# Patient Record
Sex: FEMALE | Race: WHITE | Employment: OTHER | URBAN - METROPOLITAN AREA
[De-identification: names, ages, dates, MRNs, and addresses within clinical notes are randomized per-mention and may not be internally consistent; named-entity substitution may affect disease eponyms.]

---

## 2017-06-08 ENCOUNTER — HOSPITAL ENCOUNTER (OUTPATIENT)
Dept: MAMMOGRAPHY | Age: 57
Discharge: HOME | End: 2017-06-08
Attending: OBSTETRICS & GYNECOLOGY
Payer: COMMERCIAL

## 2017-06-08 DIAGNOSIS — Z12.31 ENCOUNTER FOR SCREENING MAMMOGRAM FOR MALIGNANT NEOPLASM OF BREAST: ICD-10-CM

## 2017-06-08 PROCEDURE — 77067 SCR MAMMO BI INCL CAD: CPT | Performed by: OBSTETRICS & GYNECOLOGY

## 2017-08-04 ENCOUNTER — HOSPITAL ENCOUNTER (OUTPATIENT)
Dept: ULTRASOUND IMAGING | Age: 57
Discharge: HOME | End: 2017-08-04
Attending: OBSTETRICS & GYNECOLOGY
Payer: COMMERCIAL

## 2017-08-04 DIAGNOSIS — R19.00 PELVIC MASS: ICD-10-CM

## 2017-08-04 PROCEDURE — 76830 TRANSVAGINAL US NON-OB: CPT | Performed by: OBSTETRICS & GYNECOLOGY

## 2017-08-04 PROCEDURE — 76856 US EXAM PELVIC COMPLETE: CPT | Performed by: OBSTETRICS & GYNECOLOGY

## 2017-09-12 NOTE — H&P
[facility]    PATIENT'S NAME: Jonathon Parra   ATTENDING PHYSICIAN: Apple Osorio MD   PATIENT ACCOUNT#:   [de-identified]    LOCATION:  [facility]  MEDICAL RECORD #:   [id number]       YOB: 1960  ADMISSION DATE:       09/22/20 uterus is bulky approximately 10 to 12 week size. No adnexal masses are appreciated. The patient underwent preoperative simple cystometry which confirmed mixed urinary incontinence and complete emptying.     IMPRESSION:  Symptomatic stage II pelvic orga

## 2017-09-18 ENCOUNTER — LAB ENCOUNTER (OUTPATIENT)
Dept: LAB | Age: 57
End: 2017-09-18
Attending: OBSTETRICS & GYNECOLOGY
Payer: COMMERCIAL

## 2017-09-18 DIAGNOSIS — Z01.818 PREOP TESTING: ICD-10-CM

## 2017-09-18 LAB
ANTIBODY SCREEN: NEGATIVE
BASOPHILS # BLD: 0.1 K/UL (ref 0–0.2)
BASOPHILS NFR BLD: 1 %
EOSINOPHIL # BLD: 0.1 K/UL (ref 0–0.7)
EOSINOPHIL NFR BLD: 1 %
ERYTHROCYTE [DISTWIDTH] IN BLOOD BY AUTOMATED COUNT: 14.1 % (ref 11–15)
HCT VFR BLD AUTO: 44.1 % (ref 35–48)
HGB BLD-MCNC: 14.6 G/DL (ref 12–16)
LYMPHOCYTES # BLD: 1.3 K/UL (ref 1–4)
LYMPHOCYTES NFR BLD: 20 %
MCH RBC QN AUTO: 30.9 PG (ref 27–32)
MCHC RBC AUTO-ENTMCNC: 33.1 G/DL (ref 32–37)
MCV RBC AUTO: 93.4 FL (ref 80–100)
MONOCYTES # BLD: 0.5 K/UL (ref 0–1)
MONOCYTES NFR BLD: 9 %
NEUTROPHILS # BLD AUTO: 4.4 K/UL (ref 1.8–7.7)
NEUTROPHILS NFR BLD: 69 %
PLATELET # BLD AUTO: 205 K/UL (ref 140–400)
PMV BLD AUTO: 9.5 FL (ref 7.4–10.3)
RBC # BLD AUTO: 4.72 M/UL (ref 3.7–5.4)
RH BLOOD TYPE: POSITIVE
WBC # BLD AUTO: 6.4 K/UL (ref 4–11)

## 2017-09-18 PROCEDURE — 86900 BLOOD TYPING SEROLOGIC ABO: CPT

## 2017-09-18 PROCEDURE — 36415 COLL VENOUS BLD VENIPUNCTURE: CPT

## 2017-09-18 PROCEDURE — 85025 COMPLETE CBC W/AUTO DIFF WBC: CPT

## 2017-09-18 PROCEDURE — 86901 BLOOD TYPING SEROLOGIC RH(D): CPT

## 2017-09-18 PROCEDURE — 86850 RBC ANTIBODY SCREEN: CPT

## 2017-09-21 NOTE — H&P
[facility]    PATIENT'S NAME: Amy Wongmima   ATTENDING PHYSICIAN: Mary Jane Alberts MD   PATIENT ACCOUNT#:   [de-identified]    LOCATION:  [facility]  MEDICAL RECORD #:   [id number]       YOB: 1960  ADMISSION DATE:       09/22/20 appearing. LUNGS:  Clear to auscultation bilaterally. HEART:  Regular rate and rhythm. ABDOMEN:  Soft, nontender, nondistended. PELVIC:  Uterus consistent with ultrasound findings showing posterior fibroid; however, she has good uterine descensus.   E

## 2017-09-22 ENCOUNTER — HOSPITAL ENCOUNTER (INPATIENT)
Facility: HOSPITAL | Age: 57
LOS: 1 days | Discharge: HOME | DRG: 743 | End: 2017-09-23
Attending: OBSTETRICS & GYNECOLOGY | Admitting: OBSTETRICS & GYNECOLOGY
Payer: COMMERCIAL

## 2017-09-22 ENCOUNTER — SURGERY (OUTPATIENT)
Age: 57
End: 2017-09-22

## 2017-09-22 ENCOUNTER — ANESTHESIA EVENT (OUTPATIENT)
Dept: SURGERY | Facility: HOSPITAL | Age: 57
DRG: 743 | End: 2017-09-22
Payer: COMMERCIAL

## 2017-09-22 ENCOUNTER — ANESTHESIA (OUTPATIENT)
Dept: SURGERY | Facility: HOSPITAL | Age: 57
DRG: 743 | End: 2017-09-22
Payer: COMMERCIAL

## 2017-09-22 DIAGNOSIS — Z01.818 PREOP TESTING: Primary | ICD-10-CM

## 2017-09-22 PROCEDURE — 0JQC0ZZ REPAIR PELVIC REGION SUBCUTANEOUS TISSUE AND FASCIA, OPEN APPROACH: ICD-10-PCS | Performed by: OBSTETRICS & GYNECOLOGY

## 2017-09-22 PROCEDURE — 0TSD0ZZ REPOSITION URETHRA, OPEN APPROACH: ICD-10-PCS | Performed by: OBSTETRICS & GYNECOLOGY

## 2017-09-22 PROCEDURE — 0USG0ZZ REPOSITION VAGINA, OPEN APPROACH: ICD-10-PCS | Performed by: OBSTETRICS & GYNECOLOGY

## 2017-09-22 PROCEDURE — 0UT7FZZ RESECTION OF BILATERAL FALLOPIAN TUBES, VIA NATURAL OR ARTIFICIAL OPENING WITH PERCUTANEOUS ENDOSCOPIC ASSISTANCE: ICD-10-PCS | Performed by: OBSTETRICS & GYNECOLOGY

## 2017-09-22 PROCEDURE — 0UTC7ZZ RESECTION OF CERVIX, VIA NATURAL OR ARTIFICIAL OPENING: ICD-10-PCS | Performed by: OBSTETRICS & GYNECOLOGY

## 2017-09-22 PROCEDURE — 0UT9FZZ RESECTION OF UTERUS, VIA NATURAL OR ARTIFICIAL OPENING WITH PERCUTANEOUS ENDOSCOPIC ASSISTANCE: ICD-10-PCS | Performed by: OBSTETRICS & GYNECOLOGY

## 2017-09-22 PROCEDURE — 0TJB8ZZ INSPECTION OF BLADDER, VIA NATURAL OR ARTIFICIAL OPENING ENDOSCOPIC: ICD-10-PCS | Performed by: OBSTETRICS & GYNECOLOGY

## 2017-09-22 PROCEDURE — 88307 TISSUE EXAM BY PATHOLOGIST: CPT | Performed by: OBSTETRICS & GYNECOLOGY

## 2017-09-22 DEVICE — CONTINENCE SYSTEM
Type: IMPLANTABLE DEVICE | Status: FUNCTIONAL
Brand: GYNECARE TVT EXACT

## 2017-09-22 RX ORDER — HYDROCODONE BITARTRATE AND ACETAMINOPHEN 7.5; 325 MG/1; MG/1
1 TABLET ORAL EVERY 4 HOURS PRN
Status: DISCONTINUED | OUTPATIENT
Start: 2017-09-22 | End: 2017-09-23

## 2017-09-22 RX ORDER — HYDROMORPHONE HYDROCHLORIDE 1 MG/ML
0.4 INJECTION, SOLUTION INTRAMUSCULAR; INTRAVENOUS; SUBCUTANEOUS EVERY 5 MIN PRN
Status: DISCONTINUED | OUTPATIENT
Start: 2017-09-22 | End: 2017-09-22

## 2017-09-22 RX ORDER — ONDANSETRON 2 MG/ML
INJECTION INTRAMUSCULAR; INTRAVENOUS AS NEEDED
Status: DISCONTINUED | OUTPATIENT
Start: 2017-09-22 | End: 2017-09-22 | Stop reason: SURG

## 2017-09-22 RX ORDER — NEOSTIGMINE METHYLSULFATE 0.5 MG/ML
INJECTION INTRAVENOUS AS NEEDED
Status: DISCONTINUED | OUTPATIENT
Start: 2017-09-22 | End: 2017-09-22 | Stop reason: SURG

## 2017-09-22 RX ORDER — ACETAMINOPHEN 325 MG/1
650 TABLET ORAL ONCE
Status: COMPLETED | OUTPATIENT
Start: 2017-09-22 | End: 2017-09-22

## 2017-09-22 RX ORDER — DIPHENHYDRAMINE HYDROCHLORIDE 50 MG/ML
12.5 INJECTION INTRAMUSCULAR; INTRAVENOUS EVERY 4 HOURS PRN
Status: DISCONTINUED | OUTPATIENT
Start: 2017-09-22 | End: 2017-09-23

## 2017-09-22 RX ORDER — ACETAMINOPHEN 325 MG/1
650 TABLET ORAL EVERY 4 HOURS PRN
Status: DISCONTINUED | OUTPATIENT
Start: 2017-09-22 | End: 2017-09-23

## 2017-09-22 RX ORDER — HYDROCODONE BITARTRATE AND ACETAMINOPHEN 7.5; 325 MG/1; MG/1
2 TABLET ORAL EVERY 4 HOURS PRN
Status: DISCONTINUED | OUTPATIENT
Start: 2017-09-22 | End: 2017-09-23

## 2017-09-22 RX ORDER — MIDAZOLAM HYDROCHLORIDE 1 MG/ML
INJECTION INTRAMUSCULAR; INTRAVENOUS AS NEEDED
Status: DISCONTINUED | OUTPATIENT
Start: 2017-09-22 | End: 2017-09-22 | Stop reason: SURG

## 2017-09-22 RX ORDER — NALOXONE HYDROCHLORIDE 0.4 MG/ML
80 INJECTION, SOLUTION INTRAMUSCULAR; INTRAVENOUS; SUBCUTANEOUS AS NEEDED
Status: DISCONTINUED | OUTPATIENT
Start: 2017-09-22 | End: 2017-09-22

## 2017-09-22 RX ORDER — ONDANSETRON 2 MG/ML
4 INJECTION INTRAMUSCULAR; INTRAVENOUS EVERY 8 HOURS PRN
Status: DISCONTINUED | OUTPATIENT
Start: 2017-09-22 | End: 2017-09-23

## 2017-09-22 RX ORDER — HYDROMORPHONE HYDROCHLORIDE 1 MG/ML
0.2 INJECTION, SOLUTION INTRAMUSCULAR; INTRAVENOUS; SUBCUTANEOUS EVERY 5 MIN PRN
Status: DISCONTINUED | OUTPATIENT
Start: 2017-09-22 | End: 2017-09-22

## 2017-09-22 RX ORDER — MORPHINE SULFATE 2 MG/ML
2 INJECTION, SOLUTION INTRAMUSCULAR; INTRAVENOUS EVERY 10 MIN PRN
Status: DISCONTINUED | OUTPATIENT
Start: 2017-09-22 | End: 2017-09-22

## 2017-09-22 RX ORDER — SODIUM CHLORIDE, SODIUM LACTATE, POTASSIUM CHLORIDE, CALCIUM CHLORIDE 600; 310; 30; 20 MG/100ML; MG/100ML; MG/100ML; MG/100ML
INJECTION, SOLUTION INTRAVENOUS CONTINUOUS
Status: DISCONTINUED | OUTPATIENT
Start: 2017-09-22 | End: 2017-09-22 | Stop reason: HOSPADM

## 2017-09-22 RX ORDER — ONDANSETRON 4 MG/1
4 TABLET, FILM COATED ORAL EVERY 8 HOURS PRN
Status: DISCONTINUED | OUTPATIENT
Start: 2017-09-22 | End: 2017-09-23

## 2017-09-22 RX ORDER — FAMOTIDINE 20 MG/1
20 TABLET ORAL ONCE
Status: COMPLETED | OUTPATIENT
Start: 2017-09-22 | End: 2017-09-22

## 2017-09-22 RX ORDER — KETOROLAC TROMETHAMINE 30 MG/ML
30 INJECTION, SOLUTION INTRAMUSCULAR; INTRAVENOUS EVERY 6 HOURS
Status: DISCONTINUED | OUTPATIENT
Start: 2017-09-22 | End: 2017-09-23

## 2017-09-22 RX ORDER — METOCLOPRAMIDE 10 MG/1
10 TABLET ORAL ONCE
Status: COMPLETED | OUTPATIENT
Start: 2017-09-22 | End: 2017-09-22

## 2017-09-22 RX ORDER — ONDANSETRON 2 MG/ML
4 INJECTION INTRAMUSCULAR; INTRAVENOUS ONCE AS NEEDED
Status: DISCONTINUED | OUTPATIENT
Start: 2017-09-22 | End: 2017-09-22

## 2017-09-22 RX ORDER — DOCUSATE SODIUM 100 MG/1
100 CAPSULE, LIQUID FILLED ORAL 2 TIMES DAILY
Status: DISCONTINUED | OUTPATIENT
Start: 2017-09-22 | End: 2017-09-23

## 2017-09-22 RX ORDER — SODIUM PHOSPHATE, DIBASIC AND SODIUM PHOSPHATE, MONOBASIC 7; 19 G/133ML; G/133ML
1 ENEMA RECTAL ONCE AS NEEDED
Status: DISCONTINUED | OUTPATIENT
Start: 2017-09-22 | End: 2017-09-23

## 2017-09-22 RX ORDER — ZOLPIDEM TARTRATE 5 MG/1
5 TABLET ORAL NIGHTLY PRN
Status: DISCONTINUED | OUTPATIENT
Start: 2017-09-22 | End: 2017-09-23

## 2017-09-22 RX ORDER — HYDROCODONE BITARTRATE AND ACETAMINOPHEN 5; 325 MG/1; MG/1
1 TABLET ORAL AS NEEDED
Status: DISCONTINUED | OUTPATIENT
Start: 2017-09-22 | End: 2017-09-22

## 2017-09-22 RX ORDER — MORPHINE SULFATE 10 MG/ML
6 INJECTION, SOLUTION INTRAMUSCULAR; INTRAVENOUS EVERY 10 MIN PRN
Status: DISCONTINUED | OUTPATIENT
Start: 2017-09-22 | End: 2017-09-22

## 2017-09-22 RX ORDER — SODIUM CHLORIDE 0.9 % (FLUSH) 0.9 %
10 SYRINGE (ML) INJECTION AS NEEDED
Status: DISCONTINUED | OUTPATIENT
Start: 2017-09-22 | End: 2017-09-23

## 2017-09-22 RX ORDER — DEXAMETHASONE SODIUM PHOSPHATE 4 MG/ML
VIAL (ML) INJECTION AS NEEDED
Status: DISCONTINUED | OUTPATIENT
Start: 2017-09-22 | End: 2017-09-22 | Stop reason: SURG

## 2017-09-22 RX ORDER — HYDROMORPHONE HYDROCHLORIDE 1 MG/ML
0.4 INJECTION, SOLUTION INTRAMUSCULAR; INTRAVENOUS; SUBCUTANEOUS EVERY 2 HOUR PRN
Status: DISCONTINUED | OUTPATIENT
Start: 2017-09-22 | End: 2017-09-23

## 2017-09-22 RX ORDER — HYDROMORPHONE HYDROCHLORIDE 1 MG/ML
0.2 INJECTION, SOLUTION INTRAMUSCULAR; INTRAVENOUS; SUBCUTANEOUS EVERY 2 HOUR PRN
Status: DISCONTINUED | OUTPATIENT
Start: 2017-09-22 | End: 2017-09-23

## 2017-09-22 RX ORDER — LIDOCAINE HYDROCHLORIDE 10 MG/ML
INJECTION, SOLUTION EPIDURAL; INFILTRATION; INTRACAUDAL; PERINEURAL AS NEEDED
Status: DISCONTINUED | OUTPATIENT
Start: 2017-09-22 | End: 2017-09-22 | Stop reason: SURG

## 2017-09-22 RX ORDER — HYDROCODONE BITARTRATE AND ACETAMINOPHEN 5; 325 MG/1; MG/1
2 TABLET ORAL AS NEEDED
Status: DISCONTINUED | OUTPATIENT
Start: 2017-09-22 | End: 2017-09-22

## 2017-09-22 RX ORDER — HYDROMORPHONE HYDROCHLORIDE 1 MG/ML
0.8 INJECTION, SOLUTION INTRAMUSCULAR; INTRAVENOUS; SUBCUTANEOUS EVERY 2 HOUR PRN
Status: DISCONTINUED | OUTPATIENT
Start: 2017-09-22 | End: 2017-09-23

## 2017-09-22 RX ORDER — ROCURONIUM BROMIDE 10 MG/ML
INJECTION, SOLUTION INTRAVENOUS AS NEEDED
Status: DISCONTINUED | OUTPATIENT
Start: 2017-09-22 | End: 2017-09-22 | Stop reason: SURG

## 2017-09-22 RX ORDER — BISACODYL 10 MG
10 SUPPOSITORY, RECTAL RECTAL
Status: DISCONTINUED | OUTPATIENT
Start: 2017-09-22 | End: 2017-09-23

## 2017-09-22 RX ORDER — DEXTROSE, SODIUM CHLORIDE, SODIUM LACTATE, POTASSIUM CHLORIDE, AND CALCIUM CHLORIDE 5; .6; .31; .03; .02 G/100ML; G/100ML; G/100ML; G/100ML; G/100ML
INJECTION, SOLUTION INTRAVENOUS CONTINUOUS
Status: DISCONTINUED | OUTPATIENT
Start: 2017-09-22 | End: 2017-09-23

## 2017-09-22 RX ORDER — SODIUM CHLORIDE, SODIUM LACTATE, POTASSIUM CHLORIDE, CALCIUM CHLORIDE 600; 310; 30; 20 MG/100ML; MG/100ML; MG/100ML; MG/100ML
INJECTION, SOLUTION INTRAVENOUS CONTINUOUS
Status: DISCONTINUED | OUTPATIENT
Start: 2017-09-22 | End: 2017-09-22

## 2017-09-22 RX ORDER — BUPIVACAINE HYDROCHLORIDE AND EPINEPHRINE 2.5; 5 MG/ML; UG/ML
INJECTION, SOLUTION INFILTRATION; PERINEURAL AS NEEDED
Status: DISCONTINUED | OUTPATIENT
Start: 2017-09-22 | End: 2017-09-22

## 2017-09-22 RX ORDER — HYDROMORPHONE HYDROCHLORIDE 1 MG/ML
0.6 INJECTION, SOLUTION INTRAMUSCULAR; INTRAVENOUS; SUBCUTANEOUS EVERY 5 MIN PRN
Status: DISCONTINUED | OUTPATIENT
Start: 2017-09-22 | End: 2017-09-22

## 2017-09-22 RX ORDER — GLYCOPYRROLATE 0.2 MG/ML
INJECTION INTRAMUSCULAR; INTRAVENOUS AS NEEDED
Status: DISCONTINUED | OUTPATIENT
Start: 2017-09-22 | End: 2017-09-22 | Stop reason: SURG

## 2017-09-22 RX ORDER — POLYETHYLENE GLYCOL 3350 17 G/17G
17 POWDER, FOR SOLUTION ORAL DAILY PRN
Status: DISCONTINUED | OUTPATIENT
Start: 2017-09-22 | End: 2017-09-23

## 2017-09-22 RX ORDER — MORPHINE SULFATE 4 MG/ML
4 INJECTION, SOLUTION INTRAMUSCULAR; INTRAVENOUS EVERY 10 MIN PRN
Status: DISCONTINUED | OUTPATIENT
Start: 2017-09-22 | End: 2017-09-22

## 2017-09-22 RX ADMIN — LIDOCAINE HYDROCHLORIDE 50 MG: 10 INJECTION, SOLUTION EPIDURAL; INFILTRATION; INTRACAUDAL; PERINEURAL at 07:50:00

## 2017-09-22 RX ADMIN — ROCURONIUM BROMIDE 40 MG: 10 INJECTION, SOLUTION INTRAVENOUS at 07:50:00

## 2017-09-22 RX ADMIN — SODIUM CHLORIDE, SODIUM LACTATE, POTASSIUM CHLORIDE, CALCIUM CHLORIDE: 600; 310; 30; 20 INJECTION, SOLUTION INTRAVENOUS at 08:13:00

## 2017-09-22 RX ADMIN — GLYCOPYRROLATE 0.2 MG: 0.2 INJECTION INTRAMUSCULAR; INTRAVENOUS at 07:50:00

## 2017-09-22 RX ADMIN — SODIUM CHLORIDE, SODIUM LACTATE, POTASSIUM CHLORIDE, CALCIUM CHLORIDE: 600; 310; 30; 20 INJECTION, SOLUTION INTRAVENOUS at 10:59:00

## 2017-09-22 RX ADMIN — DEXAMETHASONE SODIUM PHOSPHATE 4 MG: 4 MG/ML VIAL (ML) INJECTION at 07:50:00

## 2017-09-22 RX ADMIN — GLYCOPYRROLATE 0.6 MG: 0.2 INJECTION INTRAMUSCULAR; INTRAVENOUS at 10:30:00

## 2017-09-22 RX ADMIN — ONDANSETRON 4 MG: 2 INJECTION INTRAMUSCULAR; INTRAVENOUS at 07:50:00

## 2017-09-22 RX ADMIN — MIDAZOLAM HYDROCHLORIDE 2 MG: 1 INJECTION INTRAMUSCULAR; INTRAVENOUS at 07:46:00

## 2017-09-22 RX ADMIN — SODIUM CHLORIDE, SODIUM LACTATE, POTASSIUM CHLORIDE, CALCIUM CHLORIDE: 600; 310; 30; 20 INJECTION, SOLUTION INTRAVENOUS at 07:46:00

## 2017-09-22 RX ADMIN — NEOSTIGMINE METHYLSULFATE 4 MG: 0.5 INJECTION INTRAVENOUS at 10:30:00

## 2017-09-22 NOTE — INTERVAL H&P NOTE
Pre-op Diagnosis: Uterovaginal prolapse, mized incontinence     The above referenced H&P was reviewed by Juan A Maynard MD on 9/22/2017, the patient was examined and no significant changes have occurred in the patient's condition since the H&P was perf

## 2017-09-22 NOTE — ANESTHESIA PREPROCEDURE EVALUATION
Anesthesia PreOp Note    HPI:     Sheridan Hernandez is a 61year old female who presents for preoperative consultation requested by: Sonido Garces MD    Date of Surgery: 9/22/2017    Procedure(s):  ANTERIOR POSTERIOR REPAIR  PUBO VAGINAL SLING  CYSTOS History   Marital status:   Spouse name: N/A    Years of education: N/A  Number of children: N/A     Occupational History  None on file     Social History Main Topics   Smoking status: Never Smoker    Smokeless tobacco: Never Used    Alcohol use Yes alternative forms of anesthetic management. All of the patient's questions were answered to the best of my ability. The patient desires the anesthetic management as planned.   Cynthia Bass  9/22/2017 7:41 AM

## 2017-09-22 NOTE — ANESTHESIA POSTPROCEDURE EVALUATION
Patient: Jordan Rose    Procedure Summary     Date:  09/22/17 Room / Location:  [address] [place] [address] MAIN OR 16 / [address] [place] [address] MAIN OR    Anesthesia Start:  8499 Anesthesia Stop:      Procedures:       ANTERIOR POSTERIOR REPAIR (N/A )      PUBO VAGINAL SLING (N/A )      CYSTOS

## 2017-09-22 NOTE — BRIEF OP NOTE
Pre-Operative Diagnosis: Uterovaginal prolapse, mized incontinence      Post-Operative Diagnosis: Uterovaginal prolapse, mized incontinence      Procedure Performed:   Procedure(s):  laparoscopic assisted vaginal hysterectomy, possible bilateral salping

## 2017-09-22 NOTE — BRIEF OP NOTE
Pre-Operative Diagnosis: Uterovaginal prolapse, mixed incontinence      Post-Operative Diagnosis: Uterovaginal prolapse, mixed incontinence      Procedure Performed:   Procedure(s):  laparoscopic assisted vaginal hysterectomy, bilateral salpingectomy, u

## 2017-09-22 NOTE — OPERATIVE REPORT
[facility]    PATIENT'S NAME: Shana Schumacher   ATTENDING PHYSICIAN: Zayra Cornell MD   OPERATING PHYSICIAN: Amy Gant MD   PATIENT ACCOUNT#:   [id number]    LOCATION:  [address] [place] [address] #:   [id number]       [facility] [place] cervix was grasped with 2 tenacula and a circumferential incision was made with the Bovie around the cervix. This was dissected back until the posterior peritoneum could be entered using a scissors.   A retractor was placed then and serial dissection was p

## 2017-09-23 VITALS
HEART RATE: 81 BPM | OXYGEN SATURATION: 99 % | RESPIRATION RATE: 18 BRPM | SYSTOLIC BLOOD PRESSURE: 118 MMHG | DIASTOLIC BLOOD PRESSURE: 61 MMHG | WEIGHT: 194 LBS | BODY MASS INDEX: 33.12 KG/M2 | TEMPERATURE: 98 F | HEIGHT: 64 IN

## 2017-09-23 PROBLEM — N81.4 UTEROVAGINAL PROLAPSE: Status: ACTIVE | Noted: 2017-09-23

## 2017-09-23 PROBLEM — D21.9 LEIOMYOMA: Status: RESOLVED | Noted: 2017-09-23 | Resolved: 2017-09-23

## 2017-09-23 PROBLEM — N39.46 MIXED URGE AND STRESS INCONTINENCE: Status: RESOLVED | Noted: 2017-09-23 | Resolved: 2017-09-23

## 2017-09-23 PROBLEM — N39.46 MIXED URGE AND STRESS INCONTINENCE: Status: ACTIVE | Noted: 2017-09-23

## 2017-09-23 PROBLEM — N81.4 UTEROVAGINAL PROLAPSE: Status: RESOLVED | Noted: 2017-09-23 | Resolved: 2017-09-23

## 2017-09-23 PROBLEM — Z01.818 PREOP TESTING: Status: RESOLVED | Noted: 2017-09-22 | Resolved: 2017-09-23

## 2017-09-23 PROBLEM — D21.9 LEIOMYOMA: Status: ACTIVE | Noted: 2017-09-23

## 2017-09-23 LAB
HCT VFR BLD AUTO: 37.3 % (ref 35–48)
HGB BLD-MCNC: 12.3 G/DL (ref 12–16)

## 2017-09-23 PROCEDURE — 85018 HEMOGLOBIN: CPT | Performed by: OBSTETRICS & GYNECOLOGY

## 2017-09-23 PROCEDURE — 85014 HEMATOCRIT: CPT | Performed by: OBSTETRICS & GYNECOLOGY

## 2017-09-23 RX ORDER — HYDROCODONE BITARTRATE AND ACETAMINOPHEN 7.5; 325 MG/1; MG/1
1 TABLET ORAL EVERY 4 HOURS PRN
Qty: 5 TABLET | Refills: 0 | Status: SHIPPED | OUTPATIENT
Start: 2017-09-23 | End: 2019-06-18

## 2017-09-23 NOTE — DISCHARGE SUMMARY
[facility] AND [facility]  Discharge Summary    Azra Byrd Patient Status:  Inpatient    1960 MRN [id number]   Location [facility] 4W/SW/SE Attending Zeferino Ho MD   Hosp Day # 1 PCP Claudean Palmer, MD     Date of Admission:  9

## 2017-09-23 NOTE — OPERATIVE REPORT
[facility]    PATIENT'S NAME: Lilli Ortiz   ATTENDING PHYSICIAN: Gonzalo Ferguson MD   OPERATING PHYSICIAN: Gonzalo Ferguson MD   PATIENT ACCOUNT#:   [de-identified]    LOCATION:  [address] [place] [address] #:   [id number]       DATE OF the bladder. A rectal exam at the end of the procedure also confirmed no suture penetration through the rectal mucosa.     PROCEDURE:  The patient was immediately status post laparoscopic-assisted vaginal hysterectomy with Dr. Rakan Ron and I, please see he trocars were passed out through the suprapubic incisions. Tension on the tape was adjusted.   The trocars were cut free from the tape and the plastic sheath overlying the tape was removed through the suprapubic incisions while the 's finger was kep

## 2017-09-23 NOTE — PLAN OF CARE
Patient rested well through the night with minimal c/o pain controlled well with scheduled pain medication. Patient having scant amounts of spotting to daisha pad. Carrington removed per orders, awaiting patient first void post carrington.  Patient tolerating general d

## 2017-09-23 NOTE — PROGRESS NOTES
Kendrick Quinonez  [id number]  September 23, 2017  12:23 PM      GYN Progress Note    Subjective: rita reg diet, pain controlled with Toradol, no Norco  Objective:  /61 (BP Location: Right arm)   Pulse 81   Temp 98.1 °F (36.7 °C) (Oral)   Resp 18   Ht 5

## 2018-07-06 ENCOUNTER — HOSPITAL ENCOUNTER (OUTPATIENT)
Dept: MAMMOGRAPHY | Age: 58
Discharge: HOME | End: 2018-07-06
Attending: OBSTETRICS & GYNECOLOGY
Payer: COMMERCIAL

## 2018-07-06 DIAGNOSIS — Z12.31 ENCOUNTER FOR SCREENING MAMMOGRAM FOR MALIGNANT NEOPLASM OF BREAST: ICD-10-CM

## 2018-07-06 PROCEDURE — 77067 SCR MAMMO BI INCL CAD: CPT | Performed by: OBSTETRICS & GYNECOLOGY

## 2018-09-30 NOTE — LETTER
[facility]   Administracion de Anestesia  1.  Nelda Fried, ERIC ___________________________________ Toño maria                                         (Representante)   parte, solicito se me administre anestesia yasmin el procedimient PT C/O Left sided CP starting 3:30 pm. Denies SOB, dyspnea. PMH: HTN, HLD. Given  162 ASA and 1 intranasal nitroglycerin spray by EMS en route. 20 G IV to L hand placed by EMS. presion, dificultad al orinar, dolor de killian, disminucion del ritmo cardiaco del emeterio. Riesgos remotos incluyen: infecciones, alto bloqueo dillard, sangramiento del canal dillard, convulciones, parocardiaco y Philipp.   7. CONCIENCIA: Comprendo que es posib ([address])                                                                           (Firma del Testigo)                                                                     _________________________    ____________________________________________

## 2019-07-11 ENCOUNTER — HOSPITAL ENCOUNTER (OUTPATIENT)
Dept: MAMMOGRAPHY | Age: 59
Discharge: HOME | End: 2019-07-11
Attending: OBSTETRICS & GYNECOLOGY
Payer: COMMERCIAL

## 2019-07-11 ENCOUNTER — HOSPITAL ENCOUNTER (OUTPATIENT)
Dept: BONE DENSITY | Age: 59
Discharge: HOME | End: 2019-07-11
Attending: OBSTETRICS & GYNECOLOGY
Payer: COMMERCIAL

## 2019-07-11 DIAGNOSIS — Z12.31 ENCOUNTER FOR SCREENING MAMMOGRAM FOR MALIGNANT NEOPLASM OF BREAST: ICD-10-CM

## 2019-07-11 DIAGNOSIS — N95.1 SYMPTOMATIC MENOPAUSAL OR FEMALE CLIMACTERIC STATES: ICD-10-CM

## 2019-07-11 PROCEDURE — 77080 DXA BONE DENSITY AXIAL: CPT | Performed by: OBSTETRICS & GYNECOLOGY

## 2019-07-11 PROCEDURE — 77067 SCR MAMMO BI INCL CAD: CPT | Performed by: OBSTETRICS & GYNECOLOGY

## 2019-07-11 PROCEDURE — 77063 BREAST TOMOSYNTHESIS BI: CPT | Performed by: OBSTETRICS & GYNECOLOGY

## 2019-07-29 ENCOUNTER — HOSPITAL ENCOUNTER (OUTPATIENT)
Dept: ULTRASOUND IMAGING | Facility: HOSPITAL | Age: 59
Discharge: HOME | End: 2019-07-29
Attending: OBSTETRICS & GYNECOLOGY
Payer: COMMERCIAL

## 2019-07-29 ENCOUNTER — HOSPITAL ENCOUNTER (OUTPATIENT)
Dept: MAMMOGRAPHY | Facility: HOSPITAL | Age: 59
Discharge: HOME | End: 2019-07-29
Attending: OBSTETRICS & GYNECOLOGY
Payer: COMMERCIAL

## 2019-07-29 DIAGNOSIS — R92.8 ABNORMAL MAMMOGRAM: ICD-10-CM

## 2019-07-29 PROCEDURE — 76642 ULTRASOUND BREAST LIMITED: CPT | Performed by: OBSTETRICS & GYNECOLOGY

## 2019-07-29 PROCEDURE — 77065 DX MAMMO INCL CAD UNI: CPT | Performed by: OBSTETRICS & GYNECOLOGY

## 2019-07-29 PROCEDURE — 77061 BREAST TOMOSYNTHESIS UNI: CPT | Performed by: OBSTETRICS & GYNECOLOGY

## 2020-01-27 ENCOUNTER — HOSPITAL ENCOUNTER (OUTPATIENT)
Dept: MAMMOGRAPHY | Facility: HOSPITAL | Age: 60
Discharge: HOME | End: 2020-01-27
Attending: OBSTETRICS & GYNECOLOGY
Payer: COMMERCIAL

## 2020-01-27 ENCOUNTER — HOSPITAL ENCOUNTER (OUTPATIENT)
Dept: ULTRASOUND IMAGING | Facility: HOSPITAL | Age: 60
Discharge: HOME | End: 2020-01-27
Attending: OBSTETRICS & GYNECOLOGY
Payer: COMMERCIAL

## 2020-01-27 DIAGNOSIS — R92.8 OTHER ABNORMAL AND INCONCLUSIVE FINDINGS ON DIAGNOSTIC IMAGING OF BREAST: ICD-10-CM

## 2020-01-27 PROCEDURE — 77065 DX MAMMO INCL CAD UNI: CPT | Performed by: OBSTETRICS & GYNECOLOGY

## 2020-01-27 PROCEDURE — 76642 ULTRASOUND BREAST LIMITED: CPT | Performed by: OBSTETRICS & GYNECOLOGY

## 2020-01-27 PROCEDURE — 77061 BREAST TOMOSYNTHESIS UNI: CPT | Performed by: OBSTETRICS & GYNECOLOGY

## 2020-08-20 ENCOUNTER — HOSPITAL ENCOUNTER (OUTPATIENT)
Dept: ULTRASOUND IMAGING | Facility: HOSPITAL | Age: 60
Discharge: HOME | End: 2020-08-20
Attending: OBSTETRICS & GYNECOLOGY
Payer: COMMERCIAL

## 2020-08-20 ENCOUNTER — HOSPITAL ENCOUNTER (OUTPATIENT)
Dept: MAMMOGRAPHY | Facility: HOSPITAL | Age: 60
Discharge: HOME | End: 2020-08-20
Attending: OBSTETRICS & GYNECOLOGY
Payer: COMMERCIAL

## 2020-08-20 DIAGNOSIS — R92.8 ABNORMAL MAMMOGRAM: ICD-10-CM

## 2020-08-20 PROCEDURE — 77066 DX MAMMO INCL CAD BI: CPT | Performed by: OBSTETRICS & GYNECOLOGY

## 2020-08-20 PROCEDURE — 77062 BREAST TOMOSYNTHESIS BI: CPT | Performed by: OBSTETRICS & GYNECOLOGY

## 2020-08-20 PROCEDURE — 76642 ULTRASOUND BREAST LIMITED: CPT | Performed by: OBSTETRICS & GYNECOLOGY

## 2021-02-22 ENCOUNTER — HOSPITAL ENCOUNTER (OUTPATIENT)
Dept: ULTRASOUND IMAGING | Facility: HOSPITAL | Age: 61
Discharge: HOME | End: 2021-02-22
Attending: OBSTETRICS & GYNECOLOGY
Payer: COMMERCIAL

## 2021-02-22 DIAGNOSIS — R92.8 ABNORMAL ULTRASOUND OF BREAST: ICD-10-CM

## 2021-02-22 DIAGNOSIS — R92.8 ABNORMAL MAMMOGRAM: ICD-10-CM

## 2021-02-22 PROCEDURE — 76642 ULTRASOUND BREAST LIMITED: CPT | Performed by: OBSTETRICS & GYNECOLOGY

## 2021-08-10 ENCOUNTER — HOSPITAL ENCOUNTER (OUTPATIENT)
Dept: ULTRASOUND IMAGING | Facility: HOSPITAL | Age: 61
Discharge: HOME | End: 2021-08-10
Attending: OBSTETRICS & GYNECOLOGY
Payer: COMMERCIAL

## 2021-08-10 ENCOUNTER — HOSPITAL ENCOUNTER (OUTPATIENT)
Dept: MAMMOGRAPHY | Facility: HOSPITAL | Age: 61
Discharge: HOME | End: 2021-08-10
Attending: OBSTETRICS & GYNECOLOGY
Payer: COMMERCIAL

## 2021-08-10 DIAGNOSIS — R92.8 ABNORMAL ULTRASOUND OF BREAST: ICD-10-CM

## 2021-08-10 DIAGNOSIS — R92.8 ABNORMAL MAMMOGRAM: ICD-10-CM

## 2021-08-10 PROCEDURE — 77062 BREAST TOMOSYNTHESIS BI: CPT | Performed by: OBSTETRICS & GYNECOLOGY

## 2021-08-10 PROCEDURE — 76642 ULTRASOUND BREAST LIMITED: CPT | Performed by: OBSTETRICS & GYNECOLOGY

## 2021-08-10 PROCEDURE — 77066 DX MAMMO INCL CAD BI: CPT | Performed by: OBSTETRICS & GYNECOLOGY

## 2022-10-04 ENCOUNTER — HOSPITAL ENCOUNTER (OUTPATIENT)
Dept: MAMMOGRAPHY | Age: 62
Discharge: HOME | End: 2022-10-04
Attending: OBSTETRICS & GYNECOLOGY
Payer: COMMERCIAL

## 2022-10-04 DIAGNOSIS — Z12.31 ENCOUNTER FOR SCREENING MAMMOGRAM FOR MALIGNANT NEOPLASM OF BREAST: ICD-10-CM

## 2022-10-04 PROCEDURE — 77063 BREAST TOMOSYNTHESIS BI: CPT | Performed by: OBSTETRICS & GYNECOLOGY

## 2022-10-04 PROCEDURE — 77067 SCR MAMMO BI INCL CAD: CPT | Performed by: OBSTETRICS & GYNECOLOGY

## 2023-09-15 ENCOUNTER — E-ADVICE (OUTPATIENT)
Dept: OTHER | Age: 63
End: 2023-09-15

## 2023-09-25 ENCOUNTER — OFFICE VISIT (OUTPATIENT)
Dept: OBGYN | Age: 63
End: 2023-09-25

## 2023-09-25 VITALS
TEMPERATURE: 97.8 F | SYSTOLIC BLOOD PRESSURE: 132 MMHG | HEART RATE: 56 BPM | DIASTOLIC BLOOD PRESSURE: 82 MMHG | OXYGEN SATURATION: 99 %

## 2023-09-25 DIAGNOSIS — Z01.419 ENCOUNTER FOR WELL WOMAN EXAM WITH ROUTINE GYNECOLOGICAL EXAM: Primary | ICD-10-CM

## 2023-09-25 DIAGNOSIS — Z12.31 SCREENING MAMMOGRAM FOR BREAST CANCER: ICD-10-CM

## 2023-09-25 PROCEDURE — 99396 PREV VISIT EST AGE 40-64: CPT | Performed by: OBSTETRICS & GYNECOLOGY

## 2023-11-27 ENCOUNTER — HOSPITAL ENCOUNTER (OUTPATIENT)
Dept: MAMMOGRAPHY | Age: 63
Discharge: HOME | End: 2023-11-27
Attending: OBSTETRICS & GYNECOLOGY
Payer: COMMERCIAL

## 2023-11-27 DIAGNOSIS — Z12.31 ENCOUNTER FOR SCREENING MAMMOGRAM FOR MALIGNANT NEOPLASM OF BREAST: ICD-10-CM

## 2023-11-27 PROCEDURE — 77063 BREAST TOMOSYNTHESIS BI: CPT | Performed by: OBSTETRICS & GYNECOLOGY

## 2023-11-27 PROCEDURE — 77067 SCR MAMMO BI INCL CAD: CPT | Performed by: OBSTETRICS & GYNECOLOGY

## 2024-10-12 SDOH — ECONOMIC STABILITY: FOOD INSECURITY: WITHIN THE PAST 12 MONTHS, THE FOOD YOU BOUGHT JUST DIDN'T LAST AND YOU DIDN'T HAVE MONEY TO GET MORE.: NEVER TRUE

## 2024-10-12 SDOH — ECONOMIC STABILITY: HOUSING INSECURITY: DO YOU HAVE PROBLEMS WITH ANY OF THE FOLLOWING?: NONE OF THE ABOVE

## 2024-10-12 SDOH — ECONOMIC STABILITY: TRANSPORTATION INSECURITY
IN THE PAST 12 MONTHS, HAS LACK OF RELIABLE TRANSPORTATION KEPT YOU FROM MEDICAL APPOINTMENTS, MEETINGS, WORK OR FROM GETTING THINGS NEEDED FOR DAILY LIVING?: NO

## 2024-10-12 SDOH — ECONOMIC STABILITY: GENERAL: WOULD YOU LIKE HELP WITH ANY OF THE FOLLOWING NEEDS?: I DON'T WANT HELP WITH ANY OF THESE

## 2024-10-12 SDOH — ECONOMIC STABILITY: HOUSING INSECURITY: WHAT IS YOUR LIVING SITUATION TODAY?: I HAVE A STEADY PLACE TO LIVE

## 2024-10-12 ASSESSMENT — SOCIAL DETERMINANTS OF HEALTH (SDOH): IN THE PAST 12 MONTHS, HAS THE ELECTRIC, GAS, OIL, OR WATER COMPANY THREATENED TO SHUT OFF SERVICE IN YOUR HOME?: NO

## 2024-10-14 ENCOUNTER — APPOINTMENT (OUTPATIENT)
Dept: OBGYN | Age: 64
End: 2024-10-14

## 2024-10-14 VITALS
OXYGEN SATURATION: 97 % | DIASTOLIC BLOOD PRESSURE: 83 MMHG | HEART RATE: 68 BPM | TEMPERATURE: 97.7 F | SYSTOLIC BLOOD PRESSURE: 128 MMHG

## 2024-10-14 DIAGNOSIS — Z78.0 MENOPAUSE: ICD-10-CM

## 2024-10-14 DIAGNOSIS — Z12.31 SCREENING MAMMOGRAM FOR BREAST CANCER: Primary | ICD-10-CM

## 2024-10-14 ASSESSMENT — PATIENT HEALTH QUESTIONNAIRE - PHQ9
1. LITTLE INTEREST OR PLEASURE IN DOING THINGS: NOT AT ALL
CLINICAL INTERPRETATION OF PHQ2 SCORE: NO FURTHER SCREENING NEEDED
2. FEELING DOWN, DEPRESSED OR HOPELESS: NOT AT ALL
SUM OF ALL RESPONSES TO PHQ9 QUESTIONS 1 AND 2: 0
SUM OF ALL RESPONSES TO PHQ9 QUESTIONS 1 AND 2: 0

## 2024-12-04 ENCOUNTER — HOSPITAL ENCOUNTER (OUTPATIENT)
Dept: MAMMOGRAPHY | Age: 64
Discharge: HOME | End: 2024-12-04
Attending: OBSTETRICS & GYNECOLOGY
Payer: MEDICARE

## 2024-12-04 ENCOUNTER — TELEPHONE (OUTPATIENT)
Dept: OBGYN | Age: 64
End: 2024-12-04

## 2024-12-04 ENCOUNTER — HOSPITAL ENCOUNTER (OUTPATIENT)
Dept: BONE DENSITY | Age: 64
Discharge: HOME | End: 2024-12-04
Attending: OBSTETRICS & GYNECOLOGY
Payer: MEDICARE

## 2024-12-04 DIAGNOSIS — Z12.31 ENCOUNTER FOR SCREENING MAMMOGRAM FOR MALIGNANT NEOPLASM OF BREAST: ICD-10-CM

## 2024-12-04 DIAGNOSIS — Z78.0 MENOPAUSE: ICD-10-CM

## 2024-12-04 PROCEDURE — 77067 SCR MAMMO BI INCL CAD: CPT | Performed by: OBSTETRICS & GYNECOLOGY

## 2024-12-04 PROCEDURE — 77080 DXA BONE DENSITY AXIAL: CPT | Performed by: OBSTETRICS & GYNECOLOGY

## 2024-12-04 PROCEDURE — 77063 BREAST TOMOSYNTHESIS BI: CPT | Performed by: OBSTETRICS & GYNECOLOGY

## (undated) DEVICE — LUBRICANT JLY SURGILUBE 2OZ

## (undated) DEVICE — SUTURE MONOCRYL 4-0 Y845G

## (undated) DEVICE — STERILE POLYISOPRENE POWDER-FREE SURGICAL GLOVES: Brand: PROTEXIS

## (undated) DEVICE — [HIGH FLOW INSUFFLATOR,  DO NOT USE IF PACKAGE IS DAMAGED,  KEEP DRY,  KEEP AWAY FROM SUNLIGHT,  PROTECT FROM HEAT AND RADIOACTIVE SOURCES.]: Brand: PNEUMOSURE

## (undated) DEVICE — SOL H2O 3000ML IRRIG

## (undated) DEVICE — ENDOPATH 5MM ENDOSCOPIC BLUNT TIP DISSECTORS (12 POUCHES CONTAINING 3 DISSECTORS EACH): Brand: ENDOPATH

## (undated) DEVICE — SUTURE VICRYL 2-0 CT-1

## (undated) DEVICE — CURVED, SMALL JAW, OPEN SEALER/DIVIDER: Brand: LIGASURE

## (undated) DEVICE — PAD ALC 43.5X24IN PREP  LF

## (undated) DEVICE — SOL  .9 1000ML BAG

## (undated) DEVICE — CATH SECURING DEVICE STATLOCK

## (undated) DEVICE — DERMABOND LIQUID ADHESIVE

## (undated) DEVICE — VIOLET BRAIDED (POLYGLACTIN 910), SYNTHETIC ABSORBABLE SUTURE: Brand: COATED VICRYL

## (undated) DEVICE — MANIPULATOR CATH ESCP KRNR

## (undated) DEVICE — CYSTO PACK: Brand: MEDLINE INDUSTRIES, INC.

## (undated) DEVICE — MEDI-VAC NON-CONDUCTIVE SUCTION TUBING: Brand: CARDINAL HEALTH

## (undated) DEVICE — TRAY FOLEY BDX 16F STATLOCK

## (undated) DEVICE — DRAPE UNDER BUTTOCKS

## (undated) DEVICE — SUTURE VICRYL 0 UR-6

## (undated) DEVICE — SUTURE VICRYL 0 CT-1

## (undated) DEVICE — TUBING CYSTO TUR DUAL

## (undated) DEVICE — PLASTC TOOMEY SYRNG DISP

## (undated) DEVICE — YANKAUER SUCTION INSTRUMENT NO CONTROL VENT, BULB TIP, CLEAR: Brand: YANKAUER

## (undated) DEVICE — STERILE SURGICAL LUBRICANT, METAL TUBE: Brand: SURGILUBE

## (undated) DEVICE — DRAPE SHEET LG

## (undated) DEVICE — SOL  .9 1000ML BTL

## (undated) DEVICE — Device

## (undated) DEVICE — LAPAROSCOPY: Brand: MEDLINE INDUSTRIES, INC.

## (undated) DEVICE — STERILE LATEX POWDER-FREE SURGICAL GLOVESWITH NITRILE COATING: Brand: PROTEXIS

## (undated) DEVICE — DRAPE SHEET LAVH 124X112X30

## (undated) DEVICE — SUTURE VICRYL 2-0 UR-6

## (undated) DEVICE — TROCAR: Brand: KII SLEEVE

## (undated) DEVICE — SUTURE PDS II 2-0 CT-2

## (undated) DEVICE — 1016 S-DRAPE IRRIG POUCH 10/BOX: Brand: STERI-DRAPE™

## (undated) DEVICE — LITHOTOMY DRAPE: Brand: CONVERTORS

## (undated) DEVICE — TROCAR: Brand: KII FIOS FIRST ENTRY

## (undated) NOTE — LETTER
[facility] [address]  [facility] [address], [place], [place]  [address]  INFORMED CONSENT FOR TRANSFUSION OF BLOOD OR BLOOD PRODUCTS   My physician has informed me of the nature, purpose, benefits and risks of transfusion for blood and blood components carisa (Signature of Patient)                                                           (Responsible party in case of Minor,                                                                                                Incompetent, or unconscious Patient)  _____

## (undated) NOTE — LETTER
[facility]  Administration of Anesthesia  1. I, Raad Savage, or _________________________________ acting on her behalf, (Patient) (Dependent/Representative) request to receive anesthesia for my pending procedure/operation/treatment. infections, high spinal block, spinal bleeding, seizure, cardiac arrest and death. 7. AWARENESS: I understand that it is possible (but unlikely) to have explicit memory of events from the operating room while under general anesthesia.   8. ELECTROCONVULSIV (Date) (Time)                                                                                               (Responsible person in case of minor/ unconscious pt) /Relationship    My signature below affirms that prior to the time of the procedure, I have ex

## (undated) NOTE — LETTER
[address], [place], [address] [place] [address]  1. Por medio de la presente autorizo al (a).  Jada Chappell MD mi Médico y maria(s) Asistente(s), a llevar a cabo la siguiente opera procedimiento pueden ser [place] por, y a discreción de [facility].  8. Entiendo que el(la) doctor(a) y nba médicos asistentes no son empleados o agentes del hospital, sino profesionales médicos independientes a quienes el hospital ha pe le(s) he explicado los riesgos y beneficios involucradosen el rechazo del tratarniento propuesto y alternativas al tratamiento propuesto, y he respondido a las preguntas del(la) paciente(My signature below affirms that prior to the time of the procedure, I

## (undated) NOTE — LETTER
[address] [place] [address] Rd, [place], [place]     AUTHORIZATION FOR SURGICAL OPERATION OR PROCEDURE    1.  I hereby authorize Dr. Robin Price MD, my Physician(s) and whomever may be designated as the doctor's Assistant, to perform t overload. In the event that I wish to have an autologous transfusion of my own blood, or a directed donor transfusion, I will discuss this with my Physician.   5. I consent to the photographing of procedure(s) to be performed for the purposes of advancing m (Responsible person in case of minor or unconscious patient)   (Relationship to Patient)    _______________________________________________________________ ____________________________  (Witness signature)

## (undated) NOTE — LETTER
[facility] [address], [place], [place]  [address]  CONSENTIMIENTO INFORMADO PARA TRANSFUSIÓN DE NICO O PRODUCTOS DE LA NICO   Mi doctor me ha informado de la naturaleza, el propósito, los beneficios y los riesgos relacionados con transfusión de Karen Kim y/ o componentes de la Karen Kim, alex y Tez lo consideren necesario y [employer] médicos a cargo del cuidado de mi davidson. Mis médicos me pimentel dado la oportunidad de formular preguntas, mismas que pimentel contestado a mi entera satisfacción. (Firma del testigo)                                                         (Xavi/Hora)       Patient Name: Ramy Edgar     : 1960                 Printed: 9/15/2017      Medical Record #: [id number]

## (undated) NOTE — LETTER
[address] [place] [address] Rd, [place], [place]     AUTHORIZATION FOR SURGICAL OPERATION OR PROCEDURE    1.  I hereby authorize Dr. Misael Bernard MD, my Physician(s) and whomever may be designated as the doctor's Assistant, to perform t own blood, or a directed donor transfusion, I will discuss this with my Physician. 5. I consent to the photographing of procedure(s) to be performed for the purposes of advancing medicine, science and/or education, provided my identity is not revealed.  If _______________________________________________________________ ____________________________  (Witness signature)                                                                                                  (Date)                                (Time)

## (undated) NOTE — LETTER
[address], [place], [facility]  1. Por medio de la presente autorizo al (a).   Mavis Cohen MD mi Médico y maria(s) Asistente(s), a llevar a cabo la siguiente operac 8. Entiendo que el(la) doctor(a) y nba médicos asistentes no son empleados o agentes del hospital, sino profesionales médicos independientes a quienes el hospital ha permitido usar nba instalaciones para Deyanira Coffey y tratamiento de nba Vanamõisa.   9. Bassam Sanders tratarniento propuesto y alternativas al tratamiento propuesto, y he respondido a las preguntas del(la) paciente(My signature below affirms that prior to the time of the procedure, I have explained to the patient and/or his/her guardian, therisks and benef

## (undated) NOTE — LETTER
[facility] [address]  [facility] [address], [place], [place]  [address]  INFORMED CONSENT FOR TRANSFUSION OF BLOOD OR BLOOD PRODUCTS   My physician has informed me of the nature, purpose, benefits and risks of transfusion for blood and blood components carisa (Signature of Patient)                                                           (Responsible party in case of Minor,                                                                                                Incompetent, or unconscious Patient)  _____

## (undated) NOTE — LETTER
[address] [place] [address] Rd, [place], [place]     AUTHORIZATION FOR SURGICAL OPERATION OR PROCEDURE    1.  I hereby authorize Dr. Doris Rueda MD, my Physician(s) and whomever may be designated as the doctor's Assistant, to perform t own blood, or a directed donor transfusion, I will discuss this with my Physician. 5. I consent to the photographing of procedure(s) to be performed for the purposes of advancing medicine, science and/or education, provided my identity is not revealed.  If _______________________________________________________________ ____________________________  (Witness signature)                                                                                                  (Date)                                (Time)

## (undated) NOTE — LETTER
[address] [place] [address] Rd, [place], [place]     AUTHORIZATION FOR SURGICAL OPERATION OR PROCEDURE    1.  I hereby authorize Dr. De Pina MD, my Physician(s) and whomever may be designated as the doctor's Assistant, to perform t own blood, or a directed donor transfusion, I will discuss this with my Physician. 5. I consent to the photographing of procedure(s) to be performed for the purposes of advancing medicine, science and/or education, provided my identity is not revealed.  If _______________________________________________________________ ____________________________  (Witness signature)                                                                                                  (Date)                                (Time)

## (undated) NOTE — LETTER
[address] [place] [address] Rd, [place], [place]     AUTHORIZATION FOR SURGICAL OPERATION OR PROCEDURE    1.  I hereby authorize Dr. Jorge Pritchett MD, my Physician(s) and whomever may be designated as the doctor's Assistant, to perform t overload. In the event that I wish to have an autologous transfusion of my own blood, or a directed donor transfusion, I will discuss this with my Physician.   5. I consent to the photographing of procedure(s) to be performed for the purposes of advancing m (Responsible person in case of minor or unconscious patient)   (Relationship to Patient)    _______________________________________________________________ ____________________________  (Witness signature)

## (undated) NOTE — LETTER
[address] [facility], [place], [address] [place] [address]  1. Por medio de la presente autorizo al (a).  Johnathan Chappell MD mi Médico y maria(s) Asistente(s), a llevar a cabo la siguiente opera 8. Entiendo que el(la) doctor(a) y nba médicos asistentes no son empleados o agentes del hospital, sino profesionales médicos independientes a quienes el hospital ha permitido usar nba instalaciones para Sarah Sprung y tratamiento de nba Vanamõisa.   9. Crys Ores tratarniento propuesto y alternativas al tratamiento propuesto, y he respondido a las preguntas del(la) paciente(My signature below affirms that prior to the time of the procedure, I have explained to the patient and/or his/her guardian, therisks and benef

## (undated) NOTE — LETTER
[address], [place], [place]     AUTHORIZATION FOR SURGICAL OPERATION OR PROCEDURE    1.  I hereby authorize Dr. Sarmad Santana MD, my Physician(s) and whomever may be designated as the doctor's Assistant, to perform t own blood, or a directed donor transfusion, I will discuss this with my Physician. 5. I consent to the photographing of procedure(s) to be performed for the purposes of advancing medicine, science and/or education, provided my identity is not revealed.  If _______________________________________________________________ ____________________________  (Witness signature)                                                                                                  (Date)                                (Time)

## (undated) NOTE — LETTER
[facility]  Administration of Anesthesia  1. I, Vadim Morales, or _________________________________ acting on her behalf, (Patient) (Dependent/Representative) request to receive anesthesia for my pending procedure/operation/treatment. infections, high spinal block, spinal bleeding, seizure, cardiac arrest and death. 7. AWARENESS: I understand that it is possible (but unlikely) to have explicit memory of events from the operating room while under general anesthesia.   8. ELECTROCONVULSIV (Date) (Time)                                                                                               (Responsible person in case of minor/ unconscious pt) /Relationship    My signature below affirms that prior to the time of the procedure, I have ex

## (undated) NOTE — LETTER
[address] [place] [address] Rd, [place], [place]     AUTHORIZATION FOR SURGICAL OPERATION OR PROCEDURE    1.  I hereby authorize Dr. Sam Lopez MD, my Physician(s) and whomever may be designated as the doctor's Assistant, to perform the own blood, or a directed donor transfusion, I will discuss this with my Physician. 5. I consent to the photographing of procedure(s) to be performed for the purposes of advancing medicine, science and/or education, provided my identity is not revealed.  If _______________________________________________________________ ____________________________  (Witness signature)                                                                                                  (Date)                                (Time)